# Patient Record
Sex: FEMALE
[De-identification: names, ages, dates, MRNs, and addresses within clinical notes are randomized per-mention and may not be internally consistent; named-entity substitution may affect disease eponyms.]

---

## 2019-03-20 ENCOUNTER — HOSPITAL ENCOUNTER (EMERGENCY)
Dept: HOSPITAL 14 - H.ER | Age: 49
Discharge: HOME | End: 2019-03-20
Payer: MEDICARE

## 2019-03-20 VITALS
DIASTOLIC BLOOD PRESSURE: 81 MMHG | HEART RATE: 89 BPM | SYSTOLIC BLOOD PRESSURE: 127 MMHG | OXYGEN SATURATION: 97 % | TEMPERATURE: 98.1 F | RESPIRATION RATE: 18 BRPM

## 2019-03-20 DIAGNOSIS — M54.2: Primary | ICD-10-CM

## 2019-03-20 DIAGNOSIS — M62.830: ICD-10-CM

## 2019-03-20 PROCEDURE — 96372 THER/PROPH/DIAG INJ SC/IM: CPT

## 2019-03-20 PROCEDURE — 99282 EMERGENCY DEPT VISIT SF MDM: CPT

## 2019-03-20 NOTE — ED PDOC
HPI: General Adult


Time Seen by Provider: 19 12:21


Chief Complaint (Nursing): Upper Extremity Problem/Injury


Chief Complaint (Provider): Neck pain


History Per: Patient,  (ashley #[id number])


History/Exam Limitations: no limitations


Onset/Duration Of Symptoms: Days (2x)


Current Symptoms Are (Timing): Still Present


Additional Complaint(s): 


48 year old female with a past medial history of arthritis and diabetes presents

to the ED for evaluation of worsening neck pain that started last night. Patient

states that the pain worsens with movement, and describes it as a sharp cramping

sensation. Patient states that the pain initially started on the left side of 

her neck, but now the pain is to both sides. Patient denies taking medications 

prior to arrival. Patient reports having a similar episode of symptoms 3-4x 

months ago and was diagnosed with a muscle spasm. Patient states that the 

symptoms are identical to her previous episode. Patient denies taking 

medications prior to arrival. Otherwise: (-) fevers, (-) headaches, (-) 

dizziness, (-) nausea, (-) vomiting, (-) visual changes, (-) ear pain, (-) 

throat pain, (-) chest pain, (-) abdominal pain, (-) or shortness of breath.





PMD: Alexandra Salazar MD





Past Medical History


Reviewed: Historical Data, Nursing Documentation, Vital Signs


Vital Signs: 





                                Last Vital Signs











Temp  98.1 F   19 12:15


 


Pulse  89   19 12:15


 


Resp  18   19 12:15


 


BP  127/81   19 12:15


 


Pulse Ox  97   19 12:15











TIARA Report Viewed: Yes





- Medical History


PMH: Arthritis, Diabetes, HTN





- Surgical History


Surgical History: Cholecystectomy





- Family History


Family History: States: No Known Family Hx





- Home Medications


Home Medications: 


                                Ambulatory Orders











 Medication  Instructions  Recorded


 


oxyCODONE/Acetaminophen [Percocet 1 ea PO BID PRN #8 tab 09/16/15





5/325 mg Tab]  


 


Ibuprofen [Motrin] 600 mg PO Q6 PRN #20 tab 11/30/15


 


Acetaminophen [Acetaminophen 8 650 mg PO Q8 PRN #21 tablet.er 19





Hour]  


 


Meloxicam [Mobic] 15 mg PO DAILY PRN #10 tab 19


 


Methocarbamol [Robaxin-750] 750 mg PO Q8 PRN #12 tab 19














- Allergies


Allergies/Adverse Reactions: 


                                    Allergies











Allergy/AdvReac Type Severity Reaction Status Date / Time


 


No Known Allergies Allergy   Verified 19 12:20














Review of Systems


ROS Statement: Except As Marked, All Systems Reviewed And Found Negative


Constitutional: Negative for: Fever


Eyes: Negative for: Vision Change


ENT: Negative for: Ear Pain, Throat Pain


Cardiovascular: Negative for: Chest Pain


Respiratory: Negative for: Shortness of Breath


Gastrointestinal: Negative for: Nausea, Vomiting, Abdominal Pain


Musculoskeletal: Positive for: Neck Pain (left and right side)


Neurological: Negative for: Headache, Dizziness





Physical Exam





- Reviewed


Nursing Documentation Reviewed: Yes


Vital Signs Reviewed: Yes





- Physical Exam


Comments: 


GENERAL APPEARANCE: Patient is awake, alert, oriented x 3, in no acute distress.

Resting comfortably. 


SKIN:  Warm, dry; (-) cyanosis.


EYES:  (-) conjunctival injection


ENMT:  Mucous membranes moist. Airway patent, (-) stridor. 


NECK: Supple, FROM (+) tenderness and spasm to bilateral trapezius muscles and 

to bilateral paracervical muscles (-) stiffness, (-) lymphadenopathy (-) JVD


CHEST AND RESPIRATORY:  (-) rales, (-) rhonchi, (-) wheezes; breath sounds equal

bilaterally. Respirations even and nonlabored. 


HEART AND CARDIOVASCULAR:  (-) irregularity


ABDOMEN AND GI:  Soft; (-) tenderness


BACK: (-) direct bony tenderness, (-) deformity.


UPPER EXTREMITIES: Full ROM throughout, (-) tenderness, (-) deformity.  Distal 

pulses good bilaterally.


NEURO AND PSYCH:  Mental status as above.  Intact sensation bilaterally; normal 

strength in extension of the knees, plantar and dorsiflexion of the toes.  Gait:

steady. Speech: clear. (-) facial asymmetry (-) aphasia. Normal cognition. 





- ECG


O2 Sat by Pulse Oximetry: 97 (RA)


Pulse Ox Interpretation: Normal





Medical Decision Making


Medical Decision Makin:20


Clinical impression: 48 year old female with acute neck and shoulder pain; 

muscle spasm


Initial plan:


* flexeril 10 mg PO once (not driving home)


* toradol 30 mg IM once


* reevaluation





1300


On re-evaluation, patient reports improvement of symptoms. On exam, patient 

remains AAOx3, in no acute distress. Vitals stable. 


Lab/Diagnostic results d/w the patient in great detail. Diagnosis of acute neck 

and shoulder pain; muscle spasm d/w the patient. 


Based on history, exam and diagnostic results, plan will be for outpatient 

follow up with PMD. 


Patient instructed to follow-up with pmd / referral provided / the clinic  in 1-

2 days without fail. Advised to take medication as prescribed. Return to the 

emergency room at any time for any new or worsening symptoms. Patient states she

fully agrees with and understands discharge instructions. States that she agrees

with the plan and disposition. Verbalized and repeated discharge instructions 

and plan. I have given the patient opportunity to ask any additional questions.


 

--------------------------------------------------------------------------------


---------------------------------------


Scribe Attestation:


Documented by Lexy Yee, acting as a scribe for Lexy SANTIAGO





Provider Scribe Attestation:


All medical record entries made by the Scribe were at my direction and 

personally dictated by me. I have reviewed the chart and agree that the record 

accurately reflects my personal performance of the history, physical exam, 

medical decision making, and the department course for this patient. I have also

personally directed, reviewed, and agree with the discharge instructions and 

disposition.





Disposition





- Clinical Impression


Clinical Impression: 


 Acute neck pain, Muscle spasms of neck, Muscle spasm of shoulder region








- Patient ED Disposition


Is Patient to be Admitted: No


Counseled Patient/Family Regarding: Studies Performed, Diagnosis, Need For 

Followup, Rx Given





- Disposition


Referrals: 


Alexandra Salazar [Family Provider] - 


Disposition: Routine/Home


Disposition Time: 13:00


Condition: STABLE


Additional Instructions: 


La atencin mdica de emergencia que recibi hoy se dirigi a marvin sntomas 

agudos. Si le recetaron algn medicamento, llnelo y tmelo segn las 

indicaciones. Los sntomas pueden tardar varios salmon en resolverse. Regrese al 

Departamento de Emergencias si marvin sntomas empeoran, no mejoran o si tiene 

otros problemas.





Comunquese con john mdico dentro de 2 salmon para deep nueva evaluacin y faustina un 

seguimiento o llame a valerie de los mdicos / clnicas a los que ha sido referido y

que figuran en el formulario de Informacin de visita al paciente que se incluye

en john paquete de maryan. Lleve todos los documentos que le entregaron al momento 

del maryan junto con todos los medicamentos que est tomando para john visita de 

seguimiento. Nuestro tratamiento no puede reemplazar la atencin mdica continua

por parte de un proveedor de atencin primaria (PCP) fuera del departamento de 

emergencias.


Prescriptions: 


Acetaminophen [Acetaminophen 8 Hour] 650 mg PO Q8 PRN #21 tablet.er


 PRN Reason: Pain, Moderate (4-7)


Meloxicam [Mobic] 15 mg PO DAILY PRN #10 tab


 PRN Reason: Pain, Moderate (4-7)


Methocarbamol [Robaxin-750] 750 mg PO Q8 PRN #12 tab


 PRN Reason: Muscle Spasm


Instructions:  Neck Pain, Muscle Spasms (DC), Generalized Neck Pain


Forms:  [employer] (Mauritian)


Print Language: Slovak





- POA


Present On Arrival: None